# Patient Record
Sex: FEMALE | URBAN - METROPOLITAN AREA
[De-identification: names, ages, dates, MRNs, and addresses within clinical notes are randomized per-mention and may not be internally consistent; named-entity substitution may affect disease eponyms.]

---

## 2020-10-19 ENCOUNTER — OFFICE VISIT (OUTPATIENT)
Dept: ORTHOPEDIC SURGERY | Age: 20
End: 2020-10-19
Payer: COMMERCIAL

## 2020-10-19 VITALS — HEIGHT: 63 IN | BODY MASS INDEX: 32.43 KG/M2 | WEIGHT: 183 LBS | TEMPERATURE: 97.8 F

## 2020-10-19 PROCEDURE — 99203 OFFICE O/P NEW LOW 30 MIN: CPT | Performed by: PHYSICIAN ASSISTANT

## 2020-10-19 RX ORDER — NORETHINDRONE ACETATE AND ETHINYL ESTRADIOL 1.5-30(21)
1 KIT ORAL DAILY
COMMUNITY
Start: 2020-05-18

## 2020-10-19 RX ORDER — PANTOPRAZOLE SODIUM 40 MG/1
40 TABLET, DELAYED RELEASE ORAL
COMMUNITY
Start: 2018-11-19

## 2020-10-19 NOTE — PROGRESS NOTES
Patient Drew Barragan  Medical Record Number: <[id number]>  YOB: 2000  Date of Encounter: 10/19/2020     Chief Complaint   Patient presents with    Knee Pain     Left knee pain x3-4 weeks. No known injury. History of Present Illness:  Suad Fleming is a 23 y.o. female here for evaluation of her left knee. Her pain assessment is documented below and I reviewed this with her today. 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 She states this pain has persisted over the last 3 to 4 weeks. Most of her pain is posterolateral.  She has had associated swelling. She has experienced some locking and catching of the left knee. She tries to avoid provoking movements. She states she had similar symptoms while playing soccer in high school and never sought treatment. She denies pain in the left hip or ankle. Pain Assessment  Location of Pain: Knee  Location Modifiers: Left  Severity of Pain: 6  Quality of Pain: Aching, Other (Comment)(Shooting)  Duration of Pain: Persistent  Frequency of Pain: Intermittent  Date Pain First Started: (x3-4 weeks)  Aggravating Factors: Walking, Squatting  Limiting Behavior: Yes  Relieving Factors: Other (Comment)(Sleeping all night)  Result of Injury: No  Work-Related Injury: No  Are there other pain locations you wish to document?: No    No past medical history on file. Past Surgical History:   Procedure Laterality Date    WISDOM TOOTH EXTRACTION  2016       Current Outpatient Medications   Medication Sig Dispense Refill    norethindrone-ethinyl estradiol-iron (BLISOVI FE 1.5/30) 1.5-30 MG-MCG tablet Take 1 tablet by mouth daily      pantoprazole (PROTONIX) 40 MG tablet Take 40 mg by mouth every morning (before breakfast)       No current facility-administered medications for this visit.       Allergies, social and family histories were Patient with normal tandem gait without limp. Normal strides     Radiology:  X-rays obtained today and reviewed in office:  Views: 3 view left knee including AP, lateral and sunrise views  Impression: No evidence for acute fracture or subluxation / dislocation. There are no loose bodies appreciated. There is no evidence of tibiofemoral subluxation. There is really no loss of joint space. Impression:  Encounter Diagnoses   Name Primary?  Tear of lateral meniscus of left knee, unspecified tear type, unspecified whether old or current tear, initial encounter Yes    Acute pain of left knee        Office Procedures:  Orders Placed This Encounter   Procedures    XR KNEE BILATERAL STANDING     Standing Status:   Future     Number of Occurrences:   1     Standing Expiration Date:   10/19/2021     Order Specific Question:   Reason for exam:     Answer:   Knee Pain    XR KNEE LEFT (1-2 VIEWS)     2V Lt Knee     Standing Status:   Future     Number of Occurrences:   1     Standing Expiration Date:   10/19/2021     Order Specific Question:   Reason for exam:     Answer:   Knee Pain    MRI KNEE LEFT WO CONTRAST     [facility] [place]     Standing Status:   Future     Standing Expiration Date:   10/19/2021       Treatment Plan:          There is concern for lateral meniscal pathology which may be acute versus acute on chronic. Patient is very eager to obtain an MRI to further evaluate. She is advised to continue resting, icing and elevating. She will take Tylenol and/or Motrin as needed for pain. Patient is advised to follow-up with Dr. Sheridan Solitario after MRI. Elis Keller was informed of the results of any imaging. We discussed treatment options and a time was given to answer questions. A plan was proposed and Elis Keller understand and accepts this course of care.           Electronically signed by Wallace Reddy PA-C on 44/05/7159  Board Certified [facility]    Please note that portions of this note were completed with a voice recognition program.  Efforts were made to edit the dictations but occasionally words are mis-transcribed.

## 2020-10-26 ENCOUNTER — TELEPHONE (OUTPATIENT)
Dept: ORTHOPEDIC SURGERY | Age: 20
End: 2020-10-26

## 2020-10-26 NOTE — TELEPHONE ENCOUNTER
Per Radha Kathleen, order PT for a left knee strain. At home exercises were printed and left at the  for the patient to  and try. If those don't help she is to call back and we will order outpatient PT for her.